# Patient Record
Sex: FEMALE | Race: WHITE
[De-identification: names, ages, dates, MRNs, and addresses within clinical notes are randomized per-mention and may not be internally consistent; named-entity substitution may affect disease eponyms.]

---

## 2017-05-23 ENCOUNTER — HOSPITAL ENCOUNTER (OUTPATIENT)
Dept: HOSPITAL 47 - ORWHC2ENDO | Age: 57
Discharge: HOME | End: 2017-05-23
Attending: SURGERY
Payer: COMMERCIAL

## 2017-05-23 VITALS — DIASTOLIC BLOOD PRESSURE: 58 MMHG | SYSTOLIC BLOOD PRESSURE: 119 MMHG | HEART RATE: 67 BPM

## 2017-05-23 VITALS — RESPIRATION RATE: 18 BRPM

## 2017-05-23 VITALS — TEMPERATURE: 98.4 F

## 2017-05-23 VITALS — BODY MASS INDEX: 24 KG/M2

## 2017-05-23 DIAGNOSIS — Z79.899: ICD-10-CM

## 2017-05-23 DIAGNOSIS — Z87.891: ICD-10-CM

## 2017-05-23 DIAGNOSIS — K63.5: ICD-10-CM

## 2017-05-23 DIAGNOSIS — Z12.11: Primary | ICD-10-CM

## 2017-05-23 DIAGNOSIS — K57.30: ICD-10-CM

## 2017-05-23 DIAGNOSIS — F41.9: ICD-10-CM

## 2017-05-23 DIAGNOSIS — F31.9: ICD-10-CM

## 2017-05-23 DIAGNOSIS — J44.9: ICD-10-CM

## 2017-05-23 PROCEDURE — 45380 COLONOSCOPY AND BIOPSY: CPT

## 2017-05-23 PROCEDURE — 88305 TISSUE EXAM BY PATHOLOGIST: CPT

## 2017-05-23 NOTE — P.OP
Date of Procedure: 05/23/17


Preoperative Diagnosis: 


Screening colonoscopy


Postoperative Diagnosis: 


Diverticulosis





Left colon polyp


Procedure(s) Performed: 


Colonoscopy


Implants: 





Anesthesia: MAC


Surgeon: Wlilie Hartmann


Pathology: other (Left colon polyp)


Condition: stable


Disposition: PACU


Indications for Procedure: 





Operative Findings: 





Description of Procedure: 


The patient's placed on the endoscopy table in the lateral position.  She 

received IV sedation.  Digital rectal exam was performed which revealed no 

abnormalities.  The flexible colonoscope was then placed patient anus and 

passed throughout the entire colon.  The ileocecal valve lesions.  The cecum, 

ascending and transverse colon appeared normal.  In the descending colon there 

was a small polyp seen this removed the forcep there is also some mild 

diverticular changes.  In the sigmoid colon there is more diverticular changes 

noted.  Scope was then brought back the rectum and this appeared normal.  The 

scope was withdrawn for patient.

## 2017-05-23 NOTE — P.GSHP
History of Present Illness


H&P Date: 05/23/17


Chief Complaint: Screening colonoscopy





56-year-old female who presents today for screening colonoscopy.  Patient 

denies a significant GI complaints.





- Constitutional


Constitutional: Reports as per HPI





Past Medical History


Past Medical History: COPD


History of Any Multi-Drug Resistant Organisms: None Reported


Past Surgical History: Tonsillectomy, Tubal Ligation


Past Anesthesia/Blood Transfusion Reactions: No Reported Reaction


Past Psychological History: Anxiety, Bipolar, Depression


Smoking Status: Former smoker


Past Alcohol Use History: Occasional


Additional Past Alcohol Use History / Comment(s): STARTED SMOKING AT AGE 15 

QUIT SMOKING 2015 SMOKED 1PPD/ DRINKS ONE GLASS OF WINE DAILY


Past Drug Use History: None Reported





- Past Family History


  ** Mother


Family Medical History: Cancer


Additional Family Medical History / Comment(s): CERVICAL CANCER





  ** Father


Family Medical History: Cancer





Medications and Allergies


 Home Medications











 Medication  Instructions  Recorded  Confirmed  Type


 


Ergocalciferol [Vitamin D2] 50,000 unit PO Q7D 05/01/17 05/23/17 History


 


LORazepam [Ativan] 1 mg PO BID 05/01/17 05/23/17 History


 


OLANZapine [ZyPREXA] 20 mg PO HS 05/01/17 05/23/17 History


 


Sertraline HCl [Zoloft] 200 mg PO DAILY 05/01/17 05/23/17 History


 


cloNIDine HCL [Catapres] 0.2 mg PO BID 05/01/17 05/23/17 History


 


lamoTRIgine [LaMICtal] 100 mg PO BID 05/01/17 05/23/17 History


 


Lurasidone [Latuda] 40 mg PO DAILY 05/19/17 05/23/17 History











 Allergies











Allergy/AdvReac Type Severity Reaction Status Date / Time


 


No Known Allergies Allergy   Verified 05/23/17 08:25














Surgical - Exam


 Vital Signs











Temp Pulse Resp BP Pulse Ox


 


 98.4 F   73   16   125/73   96 


 


 05/23/17 08:19  05/23/17 08:19  05/23/17 08:19  05/23/17 08:19  05/23/17 08:19














- General


well developed, no distress





- Eyes


PERRL





- ENT


normal pinna





- Neck


no masses





- Respiratory


normal expansion





- Cardiovascular


Rhythm: regular





- Abdomen


Abdomen: soft, non tender





Assessment and Plan


Plan: 





We'll perform screening colonoscopy

## 2018-12-18 ENCOUNTER — HOSPITAL ENCOUNTER (OUTPATIENT)
Dept: HOSPITAL 47 - WWCWWP | Age: 58
End: 2018-12-18
Payer: COMMERCIAL

## 2018-12-18 VITALS — DIASTOLIC BLOOD PRESSURE: 67 MMHG | SYSTOLIC BLOOD PRESSURE: 141 MMHG | HEART RATE: 88 BPM | TEMPERATURE: 97.6 F

## 2018-12-18 VITALS — BODY MASS INDEX: 23.8 KG/M2

## 2018-12-18 DIAGNOSIS — Z12.31: Primary | ICD-10-CM

## 2018-12-18 PROCEDURE — 77067 SCR MAMMO BI INCL CAD: CPT

## 2018-12-18 NOTE — P.HPOB
History of Present Illness


H&P Date: 18


Chief Complaint: The patient is here for her routine gynecologic exam and 

mammogram.


This is a 58-year-old  with an LMP of .  The patient states she 

continues to be on HRT which she has used for hot flashes and night sweats.  

She states her menopausal symptoms have improved while she has been on HRT.  

She is uncertain if she is taking Premarin or Prempro, but she has been taking 

half tablet of 0.625 mg daily.  Previous records from Dr. Houston' office 

indicates she was on Prempro 0.5/1.5 daily in the past.  She denies any 

postmenopausal bleeding.  She has been getting her HRT prescription from 

[facility].  She is without gynecologic complaints.








Review of Systems


The patient has gained 9 pounds over the last 2 years.  She denies respiratory, 

cardiac, or G.I. problems.








Past Medical History


Past Medical History: COPD, Fibromyalgia


Additional Past Medical History / Comment(s): pre diabetic.   PAST GYN HISTORY: 

she was treated for chlamydia many years ago.  She has no other history of STDs.


History of Any Multi-Drug Resistant Organisms: None Reported


Past Surgical History: Tonsillectomy, Tubal Ligation


Past Anesthesia/Blood Transfusion Reactions: No Reported Reaction


Past Psychological History: Anxiety, Bipolar, Depression


Smoking Status: Former smoker


Past Alcohol Use History: Occasional


Additional Past Alcohol Use History / Comment(s): STARTED SMOKING AT AGE 15 

QUIT SMOKING  SMOKED 1PPD/ DRINKS ONE GLASS OF WINE DAILY


Past Drug Use History: None Reported





- Past Family History


  ** Mother


Family Medical History: Cancer


Additional Family Medical History / Comment(s): CERVICAL CANCER





  ** Father


Family Medical History: Cancer





Medications and Allergies


 Home Medications











 Medication  Instructions  Recorded  Confirmed  Type


 


Ergocalciferol [Vitamin D2] 50,000 unit PO Q7D 17 History


 


LORazepam [Ativan] 1 mg PO BID 17 History


 


Sertraline HCl [Zoloft] 200 mg PO DAILY 17 History


 


cloNIDine HCL [Catapres] 0.2 mg PO BID 17 History


 


lamoTRIgine [LaMICtal] 100 mg PO BID 17 History


 


Dicyclomine [Bentyl] 10 mg PO PRN 18  History


 


Estrogens, Conjugated [Premarin] 0.625 mg PO AS DIRECTED 18 

History


 


Omeprazole 20 mg PO PRN 18  History


 


metFORMIN HCL [Glucophage Xr] 500 mg PO HS 18 History


 


risperiDONE [RisperDAL] 0.5 mg PO TID 18 History











 Allergies











Allergy/AdvReac Type Severity Reaction Status Date / Time


 


No Known Allergies Allergy   Verified 18 10:43














Exam


 Vital Signs











  Temp Pulse BP


 


 18 11:08  97.6 F  88  141/67








 Intake and Output











 18





 22:59 06:59 14:59


 


Other:   


 


  Weight   63.049 kg











Height 5'4", weight 139 pounds, BMI 23.9.





This is a well-developed well-nourished white female who is alert and oriented 

times 3 in no acute distress.


HEENT: Within normal limits.


NECK: Supple without mass or thyromegaly.


CHEST AND LUNGS: Clear to auscultation.


HEART: Regular rate and rhythm.


BREASTS: Are without mass or discharge.


AXILLARY EXAM: Negative for adenopathy.


BACK: Negative for CVA tenderness.


ABDOMEN: Soft, nontender, without palpable masses.


PELVIC EXAM: Normal external genitalia with mild atrophy. Cervix and vagina 

appear normal mild atrophy. There is no unusual discharge.  There is no 

evidence of prolapse.  The uterus is midposition, nongravid size and nontender. 

There are no palpable adnexal masses or tenderness.


RECTAL EXAM: rectovaginal exam is negative for mass or tenderness and is 

negative for occult blood.


EXTREMITIES: Nontender.





IMPRESSION: 


1.  58-year-old menopausal female who has done well with low dose HRT used for 

vasomotor menopausal symptoms.


2.  Normal gynecologic exam.





PLAN: 


1.  Pap smear was performed.


2.  Self breast awareness was discussed with the patient.


3.  Greening mammogram will be done today.


4.  We have had a long discussion regarding HRT including the [facility] study 

findings.  We have discussed the increased risk for heart attack, stroke and 

breast cancer on HRT.  I recommended that she try to wean off of this as soon 

as possible.  Within the next month or 2, I have recommended that she go to 

taking her HRT every other day, and then to wean off completely in the next 

month or 2 following.  She was instructed to call she has problems with 

vasomotor symptoms or if any postmenopausal bleeding.  The electronic 

prescription for Prempro 0.3/1.5 will be sent to [facility] pharmacy on [address].  

She was instructed to return if she has not been able to wean off completely 

within 6 months.  She will also cause she is having worsening vasomotor 

symptoms when she tries to wean off.


5.Osteoporosis prevention was discussed.  I have stressed the importance of 

adequate calcium, vitamin D and regular exercise.  Recommended amounts of 

calcium and vitamin D were also discussed.


6.  She will return in one year and PRN.

## 2018-12-19 NOTE — MM
Reason for exam: screening  (asymptomatic).

Last mammogram was performed 2 years and 6 months ago.



History:

Patient is postmenopausal and history of other cancer.

Benign cyst aspiration of the left breast, September 9, 1998.  Benign cyst 

aspiration of the right breast, September 9, 1998.  8 cyst aspirations of the left

breast.

Taking estrogen beginning at age 55.



Physical Findings:

A clinical breast exam by your physician is recommended on an annual basis and 

results should be correlated with mammographic findings.



MG Screening Mammo w CAD

Bilateral CC and MLO view(s) were taken.

Prior study comparison: June 30, 2016, left breast MG work up mamm w CAD LT.  June 28, 2016, bilateral MG screening mammo w CAD.

The breast tissue is extremely dense which could obscure a lesion on mammography. 

Benign appearing bilateral calcifications. No suspicious abnormality.  No 

significant changes when compared with prior studies.





ASSESSMENT: Benign, BI-RAD 2



RECOMMENDATION:

Routine screening mammogram of both breasts in 1 year.

## 2020-10-21 ENCOUNTER — HOSPITAL ENCOUNTER (INPATIENT)
Dept: HOSPITAL 47 - EC | Age: 60
LOS: 5 days | Discharge: HOME | DRG: 885 | End: 2020-10-26
Payer: COMMERCIAL

## 2020-10-21 VITALS — BODY MASS INDEX: 21.8 KG/M2

## 2020-10-21 DIAGNOSIS — R45.851: ICD-10-CM

## 2020-10-21 DIAGNOSIS — Z86.59: ICD-10-CM

## 2020-10-21 DIAGNOSIS — Z80.49: ICD-10-CM

## 2020-10-21 DIAGNOSIS — Z90.89: ICD-10-CM

## 2020-10-21 DIAGNOSIS — F41.9: ICD-10-CM

## 2020-10-21 DIAGNOSIS — M79.7: ICD-10-CM

## 2020-10-21 DIAGNOSIS — I10: ICD-10-CM

## 2020-10-21 DIAGNOSIS — J44.9: ICD-10-CM

## 2020-10-21 DIAGNOSIS — Z98.51: ICD-10-CM

## 2020-10-21 DIAGNOSIS — G47.00: ICD-10-CM

## 2020-10-21 DIAGNOSIS — Z98.890: ICD-10-CM

## 2020-10-21 DIAGNOSIS — Z86.19: ICD-10-CM

## 2020-10-21 DIAGNOSIS — G89.29: ICD-10-CM

## 2020-10-21 DIAGNOSIS — Z56.0: ICD-10-CM

## 2020-10-21 DIAGNOSIS — F12.10: ICD-10-CM

## 2020-10-21 DIAGNOSIS — E11.9: ICD-10-CM

## 2020-10-21 DIAGNOSIS — F33.2: Primary | ICD-10-CM

## 2020-10-21 DIAGNOSIS — F17.290: ICD-10-CM

## 2020-10-21 DIAGNOSIS — Z79.84: ICD-10-CM

## 2020-10-21 DIAGNOSIS — Z81.8: ICD-10-CM

## 2020-10-21 DIAGNOSIS — Z71.6: ICD-10-CM

## 2020-10-21 DIAGNOSIS — Z82.5: ICD-10-CM

## 2020-10-21 DIAGNOSIS — F17.210: ICD-10-CM

## 2020-10-21 DIAGNOSIS — G43.909: ICD-10-CM

## 2020-10-21 DIAGNOSIS — Z79.899: ICD-10-CM

## 2020-10-21 DIAGNOSIS — F13.10: ICD-10-CM

## 2020-10-21 DIAGNOSIS — J30.9: ICD-10-CM

## 2020-10-21 LAB
GLUCOSE BLD-MCNC: 100 MG/DL (ref 75–99)
GLUCOSE BLD-MCNC: 101 MG/DL (ref 75–99)

## 2020-10-21 PROCEDURE — 83036 HEMOGLOBIN GLYCOSYLATED A1C: CPT

## 2020-10-21 PROCEDURE — 82075 ASSAY OF BREATH ETHANOL: CPT

## 2020-10-21 PROCEDURE — 80306 DRUG TEST PRSMV INSTRMNT: CPT

## 2020-10-21 PROCEDURE — 80053 COMPREHEN METABOLIC PANEL: CPT

## 2020-10-21 PROCEDURE — 80061 LIPID PANEL: CPT

## 2020-10-21 PROCEDURE — 84443 ASSAY THYROID STIM HORMONE: CPT

## 2020-10-21 PROCEDURE — 99285 EMERGENCY DEPT VISIT HI MDM: CPT

## 2020-10-21 PROCEDURE — 85025 COMPLETE CBC W/AUTO DIFF WBC: CPT

## 2020-10-21 SDOH — ECONOMIC STABILITY - INCOME SECURITY: UNEMPLOYMENT, UNSPECIFIED: Z56.0

## 2020-10-21 NOTE — ED
General Adult HPI





- General


Chief complaint: Psychiatric Symptoms


Stated complaint: mental health


Time Seen by Provider: 10/21/20 14:52


Source: patient, family


Mode of arrival: ambulatory


Limitations: no limitations





- History of Present Illness


Initial comments: 


Dictation was produced using dragon dictation software. please excuse any 

grammatical, word or spelling errors. 





This patient was cared for during a federal and state declared state of 

emergency secondary to Covid 19





Chief Complaint: 60-year-old female past medical history of depression anxiety 

and anxiety presents with depression





History of Present Illness: Patient is a 60-year-old female she has past medical

history depression and anxiety.  She does have a counselor and the medical 

provider that treats her for this.  She is depressed because her  will 

drive her out of the house due to the risk of jad coronavirus.  She is 

very upset about this.  She is accompanied by her sister Alissa who drove her to 

the emergency room to be evaluated.  Patient denies any suicidal attempt.  She 

had does however feel suicidal.  She repeatedly says she does not want to be 

here.  She does not have a specific plan.  She denies any homicidal ideation.  

No visual or auditory hallucinations.  Patient does not have any medical 

complaints at this time.








The ROS documented in this emergency department record has been reviewed and 

confirmed by me.  Those systems with pertinent positive or negative responses 

have been documented in the HPI.  All other systems are other negative and/or 

noncontributory.








PHYSICAL EXAM:


General Impression: Alert and oriented x3, not in acute distress


HEENT: Normocephalic atraumatic, extra-ocular movements intact, pupils equal and

reactive to light bilaterally, mucous membranes moist.


Cardiovascular: Heart regular rate and rhythm


Chest: Able to complete full sentences, no retractions, no tachypnea


Abdomen: abdomen soft, non-tender, non-distended, no organomegaly


Musculoskeletal: Pulses present and equal in all extremities, no peripheral 

edema


Motor:  no focal deficits noted


Neurological: CN II-XII grossly intact, no focal motor or sensory deficits noted


Skin: Intact with no visualized rashes


Psych: Tearful





ED course: 60 y Old female presents with depression and suicidal ideation.  

Signs upon arrival are within acceptable limits.  Patient medically cleared for 

EPS evaluation.





Patient evaluated by EPS and will admit patient to inpatient psychiatry.




















- Related Data


                                Home Medications











 Medication  Instructions  Recorded  Confirmed


 


Ergocalciferol [Vitamin D2] 50,000 unit PO MO 05/01/17 10/21/20


 


cloNIDine HCL [Catapres] 0.2 mg PO QID 05/01/17 10/21/20


 


lamoTRIgine [LaMICtal] 100 mg PO TID 05/01/17 10/21/20


 


Atomoxetine HCl [Strattera] 60 mg PO DAILY 10/21/20 10/21/20


 


Ibuprofen [Motrin] 800 mg PO DAILY PRN 10/21/20 10/21/20


 


LORazepam [Ativan] 2 mg PO Q12H PRN 10/21/20 10/21/20


 


Vilazodone HCl [Viibryd] 40 mg PO DAILY 10/21/20 10/21/20


 


metFORMIN  mg PO DAILY 10/21/20 10/21/20


 


risperiDONE 4 mg PO DAILY 10/21/20 10/21/20











                                    Allergies











Allergy/AdvReac Type Severity Reaction Status Date / Time


 


No Known Allergies Allergy   Verified 10/21/20 16:18














Review of Systems


ROS Statement: 


Those systems with pertinent positive or pertinent negative responses have been 

documented in the HPI.





ROS Other: All systems not noted in ROS Statement are negative.





Past Medical History


Past Medical History: COPD, Fibromyalgia


Additional Past Medical History / Comment(s): pre diabetic.   PAST GYN HISTORY: 

she was treated for chlamydia many years ago.  She has no other history of STDs.


History of Any Multi-Drug Resistant Organisms: None Reported


Past Surgical History: Tonsillectomy, Tubal Ligation


Past Anesthesia/Blood Transfusion Reactions: No Reported Reaction


Past Psychological History: Anxiety, Bipolar, Depression


Smoking Status: Vaper


Past Alcohol Use History: Occasional


Past Drug Use History: None Reported





- Past Family History


  ** Mother


Family Medical History: Cancer


Additional Family Medical History / Comment(s): CERVICAL CANCER





  ** Father


Family Medical History: Cancer





General Exam


Limitations: no limitations





Course


                                   Vital Signs











  10/21/20





  14:46


 


Temperature 98.1 F


 


Pulse Rate 98


 


Respiratory 16





Rate 


 


Blood Pressure 170/91


 


O2 Sat by Pulse 95





Oximetry 














Medical Decision Making





- Lab Data


                                   Lab Results











  10/21/20 Range/Units





  15:27 


 


Urine Opiates Screen  Not Detected  (NotDetected)  


 


Ur Oxycodone Screen  Not Detected  (NotDetected)  


 


Urine Methadone Screen  Not Detected  (NotDetected)  


 


Ur Propoxyphene Screen  Not Detected  (NotDetected)  


 


Ur Barbiturates Screen  Not Detected  (NotDetected)  


 


U Tricyclic Antidepress  Not Detected  (NotDetected)  


 


Ur Phencyclidine Scrn  Not Detected  (NotDetected)  


 


Ur Amphetamines Screen  Not Detected  (NotDetected)  


 


U Methamphetamines Scrn  Not Detected  (NotDetected)  


 


U Benzodiazepines Scrn  Detected H  (NotDetected)  


 


Urine Cocaine Screen  Not Detected  (NotDetected)  


 


U Marijuana (THC) Screen  Detected H  (NotDetected)  














Disposition


Clinical Impression: 


 Suicidal behavior





Disposition: ADMITTED AS IP TO THIS [facility]


Condition: Fair


Decision Time: 17:01

## 2020-10-22 LAB
ALBUMIN SERPL-MCNC: 4.6 G/DL (ref 3.5–5)
ALP SERPL-CCNC: 96 U/L (ref 38–126)
ALT SERPL-CCNC: 27 U/L (ref 4–34)
ANION GAP SERPL CALC-SCNC: 12 MMOL/L
AST SERPL-CCNC: 31 U/L (ref 14–36)
BASOPHILS # BLD AUTO: 0 K/UL (ref 0–0.2)
BASOPHILS NFR BLD AUTO: 0 %
BUN SERPL-SCNC: 7 MG/DL (ref 7–17)
CALCIUM SPEC-MCNC: 9.9 MG/DL (ref 8.4–10.2)
CHLORIDE SERPL-SCNC: 101 MMOL/L (ref 98–107)
CHOLEST SERPL-MCNC: 256 MG/DL (ref ?–200)
CO2 SERPL-SCNC: 24 MMOL/L (ref 22–30)
EOSINOPHIL # BLD AUTO: 0.1 K/UL (ref 0–0.7)
EOSINOPHIL NFR BLD AUTO: 1 %
ERYTHROCYTE [DISTWIDTH] IN BLOOD BY AUTOMATED COUNT: 4.83 M/UL (ref 3.8–5.4)
ERYTHROCYTE [DISTWIDTH] IN BLOOD: 11.9 % (ref 11.5–15.5)
GLUCOSE BLD-MCNC: 105 MG/DL (ref 75–99)
GLUCOSE BLD-MCNC: 106 MG/DL (ref 75–99)
GLUCOSE BLD-MCNC: 124 MG/DL (ref 75–99)
GLUCOSE BLD-MCNC: 129 MG/DL (ref 75–99)
GLUCOSE SERPL-MCNC: 105 MG/DL (ref 74–99)
HBA1C MFR BLD: 5.9 % (ref 4–6)
HCT VFR BLD AUTO: 45.2 % (ref 34–46)
HDLC SERPL-MCNC: 61 MG/DL (ref 40–60)
HGB BLD-MCNC: 14.9 GM/DL (ref 11.4–16)
LDLC SERPL CALC-MCNC: 145 MG/DL (ref 0–99)
LYMPHOCYTES # SPEC AUTO: 1.8 K/UL (ref 1–4.8)
LYMPHOCYTES NFR SPEC AUTO: 17 %
MCH RBC QN AUTO: 30.9 PG (ref 25–35)
MCHC RBC AUTO-ENTMCNC: 33.1 G/DL (ref 31–37)
MCV RBC AUTO: 93.5 FL (ref 80–100)
MONOCYTES # BLD AUTO: 0.4 K/UL (ref 0–1)
MONOCYTES NFR BLD AUTO: 4 %
NEUTROPHILS # BLD AUTO: 7.9 K/UL (ref 1.3–7.7)
NEUTROPHILS NFR BLD AUTO: 76 %
PLATELET # BLD AUTO: 387 K/UL (ref 150–450)
POTASSIUM SERPL-SCNC: 4.3 MMOL/L (ref 3.5–5.1)
PROT SERPL-MCNC: 7.4 G/DL (ref 6.3–8.2)
SODIUM SERPL-SCNC: 137 MMOL/L (ref 137–145)
TRIGL SERPL-MCNC: 252 MG/DL (ref ?–150)
WBC # BLD AUTO: 10.5 K/UL (ref 3.8–10.6)

## 2020-10-22 RX ADMIN — ACETAMINOPHEN PRN ML: 160 SOLUTION ORAL at 08:16

## 2020-10-22 RX ADMIN — ACETAMINOPHEN PRN MG: 325 TABLET, FILM COATED ORAL at 17:23

## 2020-10-22 RX ADMIN — ACETAMINOPHEN PRN MG: 325 TABLET, FILM COATED ORAL at 13:03

## 2020-10-22 RX ADMIN — ACETAMINOPHEN PRN MG: 325 TABLET, FILM COATED ORAL at 04:31

## 2020-10-22 RX ADMIN — IBUPROFEN PRN MG: 800 TABLET, FILM COATED ORAL at 08:32

## 2020-10-22 RX ADMIN — FLUTICASONE PROPIONATE SCH SPRAY: 50 SPRAY, METERED NASAL at 20:41

## 2020-10-22 NOTE — P.CONS
History of Present Illness





- Reason for Consult


Consult date: 10/22/20


medical eval





- Chief Complaint


suicidal





- History of Present Illness





Jaylene Reilly is a 61 yo F with PMH of major depression, T2DM who was 

brought to the ED by her sister due to expressing suicidal ideation. She lives 

with her ex- and notes she is very lonely due to the pandemic and has no 

one to talk to. She also complains of sinus pressure and headache over the psat 

few weeks. She denies chest pain, fever, chills.





Review of Systems


All systems: negative


Constitutional: Denies chills, Denies fever


Eyes: denies blurred vision, denies pain


Ears, nose, mouth and throat: Reports headache, Denies sore throat


Cardiovascular: Denies chest pain, Denies shortness of breath


Respiratory: Denies cough


Gastrointestinal: Denies abdominal pain, Denies diarrhea, Denies nausea, Denies 

vomiting


Genitourinary: Denies dysuria, Denies hematuria


Musculoskeletal: Denies myalgias


Integumentary: Denies pruritus, Denies rash


Neurological: Denies numbness, Denies weakness


Psychiatric: Reports anxiety, Reports depression, Reports suicidal ideation


Endocrine: Denies fatigue, Denies weight change





Past Medical History


Past Medical History: COPD


Additional Past Medical History / Comment(s): pre diabetic.   PAST GYN HISTORY: 

she was treated for chlamydia many years ago.  She has no other history of STDs.


History of Any Multi-Drug Resistant Organisms: None Reported


Past Surgical History: Tonsillectomy, Tubal Ligation


Past Anesthesia/Blood Transfusion Reactions: No Reported Reaction


Past Psychological History: Anxiety, Bipolar, Depression


Smoking Status: Vaper


Past Alcohol Use History: Occasional


Additional Past Alcohol Use History / Comment(s): STARTED SMOKING AT AGE 15 QUIT

SMOKING 2015 SMOKED 1PPD/ DRINKS ONE GLASS OF WINE DAILY


Past Drug Use History: None Reported





- Past Family History


  ** Mother


Family Medical History: COPD


Additional Family Medical History / Comment(s): CERVICAL CANCER





  ** Father


Family Medical History: Cancer





Medications and Allergies


                                Home Medications











 Medication  Instructions  Recorded  Confirmed  Type


 


Ergocalciferol [Vitamin D2] 50,000 unit PO MO 05/01/17 10/21/20 History


 


cloNIDine HCL [Catapres] 0.2 mg PO QID 05/01/17 10/21/20 History


 


lamoTRIgine [LaMICtal] 100 mg PO TID 05/01/17 10/21/20 History


 


Atomoxetine HCl [Strattera] 60 mg PO DAILY 10/21/20 10/21/20 History


 


Ibuprofen [Motrin] 800 mg PO DAILY PRN 10/21/20 10/21/20 History


 


LORazepam [Ativan] 2 mg PO Q12H PRN 10/21/20 10/21/20 History


 


Vilazodone HCl [Viibryd] 40 mg PO DAILY 10/21/20 10/21/20 History


 


metFORMIN  mg PO DAILY 10/21/20 10/21/20 History


 


risperiDONE 4 mg PO DAILY 10/21/20 10/21/20 History








                                    Allergies











Allergy/AdvReac Type Severity Reaction Status Date / Time


 


No Known Allergies Allergy   Verified 10/21/20 18:22














Physical Exam


Vitals: 


                                   Vital Signs











  Temp Pulse Resp BP BP


 


 10/22/20 22:02   105 H    138/97


 


 10/22/20 21:31   101 H    170/77


 


 10/22/20 17:21   113 H    135/101


 


 10/22/20 14:13   111 H    146/73


 


 10/22/20 13:05   125 H    177/77


 


 10/22/20 08:17   99    166/91


 


 10/22/20 01:36  97.8 F  87  18  147/85 








                                Intake and Output











 10/22/20 10/22/20 10/22/20





 06:59 14:59 22:59


 


Other:   


 


  Weight  57.606 kg 














General: well nourished, well developed, NAD. Vitals reviewed


Eyes: PERRL, EOMI, conjunctiva normal


HENT: normocephalic, mucus membranes moist


Neck: supple, no JVD


Lungs: normal respiratory effort, no wheezes or rales


CV: Regular rate and rhythm, no murmur. Peripheral pulses 2+


Abdomen: soft, nondistended, no organomegaly


Lymph: no cervical or axillary LAD


Skin: warm and dry.


Neuro: A&Ox3, normal mood and affect





Results


CBC & Chem 7: 


                                 10/22/20 11:28





                                 10/22/20 11:28


Labs: 


                  Abnormal Lab Results - Last 24 Hours (Table)











  10/22/20 10/22/20 10/22/20 Range/Units





  08:06 11:28 11:28 


 


Neutrophils #   7.9 H   (1.3-7.7)  k/uL


 


Glucose    105 H  (74-99)  mg/dL


 


POC Glucose (mg/dL)  124 H    (75-99)  mg/dL


 


Triglycerides    252 H  (<150)  mg/dL


 


Cholesterol    256 H  (<200)  mg/dL


 


LDL Cholesterol, Calc    145 H  (0-99)  mg/dL


 


HDL Cholesterol    61 H  (40-60)  mg/dL














  10/22/20 10/22/20 10/22/20 Range/Units





  12:57 17:45 20:06 


 


Neutrophils #     (1.3-7.7)  k/uL


 


Glucose     (74-99)  mg/dL


 


POC Glucose (mg/dL)  106 H  105 H  129 H  (75-99)  mg/dL


 


Triglycerides     (<150)  mg/dL


 


Cholesterol     (<200)  mg/dL


 


LDL Cholesterol, Calc     (0-99)  mg/dL


 


HDL Cholesterol     (40-60)  mg/dL














Assessment and Plan


(1) Allergic rhinitis


Current Visit: Yes   Status: Acute   Code(s): J30.9 - ALLERGIC RHINITIS, 

UNSPECIFIED   SNOMED Code(s): 98193396


   





(2) Suicidal behavior


Current Visit: Yes   Status: Acute   Code(s): R45.89 - OTHER SYMPTOMS AND SIGNS 

INVOLVING EMOTIONAL STATE   SNOMED Code(s): 147456230


   


Plan: 





1. Suicidal ideation. Management per psychiatry


2. Allergic rhinitis. Start flonase


3. T2DM. Continue metformin
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
## 2020-10-23 LAB
GLUCOSE BLD-MCNC: 104 MG/DL (ref 75–99)
GLUCOSE BLD-MCNC: 106 MG/DL (ref 75–99)
GLUCOSE BLD-MCNC: 124 MG/DL (ref 75–99)
GLUCOSE BLD-MCNC: 157 MG/DL (ref 75–99)

## 2020-10-23 RX ADMIN — IBUPROFEN PRN MG: 800 TABLET, FILM COATED ORAL at 07:57

## 2020-10-23 RX ADMIN — ACETAMINOPHEN PRN MG: 325 TABLET, FILM COATED ORAL at 04:17

## 2020-10-23 RX ADMIN — IBUPROFEN PRN MG: 800 TABLET, FILM COATED ORAL at 20:53

## 2020-10-23 RX ADMIN — FLUTICASONE PROPIONATE SCH SPRAY: 50 SPRAY, METERED NASAL at 20:52

## 2020-10-23 RX ADMIN — ATORVASTATIN CALCIUM SCH MG: 10 TABLET, FILM COATED ORAL at 20:17

## 2020-10-23 RX ADMIN — FLUTICASONE PROPIONATE SCH SPRAY: 50 SPRAY, METERED NASAL at 07:56

## 2020-10-23 RX ADMIN — IBUPROFEN PRN MG: 800 TABLET, FILM COATED ORAL at 00:42

## 2020-10-24 LAB
GLUCOSE BLD-MCNC: 104 MG/DL (ref 75–99)
GLUCOSE BLD-MCNC: 112 MG/DL (ref 75–99)
GLUCOSE BLD-MCNC: 145 MG/DL (ref 75–99)
GLUCOSE BLD-MCNC: 96 MG/DL (ref 75–99)

## 2020-10-24 RX ADMIN — FLUTICASONE PROPIONATE SCH SPRAY: 50 SPRAY, METERED NASAL at 08:22

## 2020-10-24 RX ADMIN — FLUTICASONE PROPIONATE SCH SPRAY: 50 SPRAY, METERED NASAL at 21:14

## 2020-10-24 RX ADMIN — ATORVASTATIN CALCIUM SCH MG: 10 TABLET, FILM COATED ORAL at 21:15

## 2020-10-24 RX ADMIN — IBUPROFEN PRN MG: 800 TABLET, FILM COATED ORAL at 08:22

## 2020-10-24 RX ADMIN — ESCITALOPRAM OXALATE SCH MG: 20 TABLET, FILM COATED ORAL at 08:21

## 2020-10-24 RX ADMIN — ACETAMINOPHEN PRN MG: 325 TABLET, FILM COATED ORAL at 16:24

## 2020-10-24 NOTE — P.PN
Progress Note - Text


Progress Note Date: 10/24/20





Interval history: 


Patient was seen wandering the hallways and was directable and agreeable to s

peak with writer.  Patient appears to be somewhat anxious however claims that 

her anxiety and depression have been improving.  She claims that she was able to

sleep a bit better last night however still feels a little bit "shaky" from her 

withdrawals.  She states that she has been trying to go to some groups.  She was

asking about possible discharge on Monday.  She appeared to be calm and appropri

ate with writer during conversation.  She claims to fair appetite. At this time 

patient denies any suicidal or homicidal ideations intent or plan. Denies any 

Auditory or visual hallucinations. Patient denies any side effects from the 

medications and has been compliant with meds. 





Mental status exam: 


General Appearance: Patient appears to be stated age is alert, directable, and 

cooperative. 


Behavior: No agitated behavior. Patient is calm and directable.  Somewhat 

anxious.


Speech: Patient's speech is fluent and nonpressured. 


Mood/Affect: Mood is improving mildly, affect is congruent and constricted. 


Suicidality/Homicidality:  Patient denies having any suicidal or homicidal 

ideation intent or plan.  


Perceptions: Patient denies any auditory or visual hallucinations.  


Though content/process: There is no evidence of any delusional thought content 

and thought process is linear and goal-directed. 


Memory and concentration: AOX3, grossly intact for the purposes of this session


Judgment and insight: improving mildly





Assessment/Plan: Continue with current diagnosis. Patient continues to meet 

criteria for inpatient psychiatric admission for symptom stabilization and 

safety.Patient will be maintained on current psychotropic medication regimen.  

Monitor for medication compliance and for any psychotropic medication side 

effects. Will continue to monitor ongoing response to treatment.  Encouraged 

participation in milieu.

## 2020-10-25 LAB
GLUCOSE BLD-MCNC: 101 MG/DL (ref 75–99)
GLUCOSE BLD-MCNC: 103 MG/DL (ref 75–99)
GLUCOSE BLD-MCNC: 112 MG/DL (ref 75–99)
GLUCOSE BLD-MCNC: 131 MG/DL (ref 75–99)

## 2020-10-25 RX ADMIN — ACETAMINOPHEN PRN MG: 325 TABLET, FILM COATED ORAL at 17:39

## 2020-10-25 RX ADMIN — ACETAMINOPHEN PRN ML: 160 SOLUTION ORAL at 20:48

## 2020-10-25 RX ADMIN — ATORVASTATIN CALCIUM SCH MG: 10 TABLET, FILM COATED ORAL at 20:08

## 2020-10-25 RX ADMIN — FLUTICASONE PROPIONATE SCH SPRAY: 50 SPRAY, METERED NASAL at 20:09

## 2020-10-25 RX ADMIN — ESCITALOPRAM OXALATE SCH MG: 20 TABLET, FILM COATED ORAL at 08:49

## 2020-10-25 RX ADMIN — FLUTICASONE PROPIONATE SCH SPRAY: 50 SPRAY, METERED NASAL at 08:49

## 2020-10-25 NOTE — P.PN
Progress Note - Text


Progress Note Date: 10/25/20





Interval history: 


Patient was seen wandering the hallways and was directable and agreeable to s

peak with writer.  Patient appears to be somewhat anxious however claims that 

her anxiety and depression have been improving.  She claims that she just 

finished group and was speaking about her story and made her feel "like crying a

little bit" and states that she wanted to go to her room to lay down.  She 

claims that she was able to sleep a bit better last night however claims that 

she only received 5 hours of sleep and requested to have her trazodone 

increased.  Patient also claims that she takes melatonin at home who is willing 

to take that in the hospital.  She states that she has been trying to go to some

groups.  She appeared to be calm and appropriate with writer during 

conversation.  She claims to fair appetite. At this time patient denies any 

suicidal or homicidal ideations intent or plan. Denies any Auditory or visual 

hallucinations. Patient denies any side effects from the medications and has 

been compliant with meds. 





Mental status exam: 


General Appearance: Patient appears to be stated age is alert, directable, and 

cooperative. 


Behavior: No agitated behavior. Patient is calm and directable. 


Speech: Patient's speech is fluent and nonpressured. 


Mood/Affect: Mood is improving mildly, affect is congruent and constricted. 


Suicidality/Homicidality:  Patient denies having any suicidal or homicidal 

ideation intent or plan.  


Perceptions: Patient denies any auditory or visual hallucinations.  


Though content/process: There is no evidence of any delusional thought content 

and thought process is linear and goal-directed. 


Memory and concentration: AOX3, grossly intact for the purposes of this session


Judgment and insight: improving mildly





Assessment/Plan: Continue with current diagnosis. Patient continues to meet 

criteria for inpatient psychiatric admission for symptom stabilization and 

safety.Patient will be maintained on current psychotropic medication regimen, 

with the exception of increasing trazodone to 200 mg daily at bedtime for 

insomnia/mood and also starting melatonin 3 mg daily at bedtime for insomnia.  

Monitor for medication compliance and for any psychotropic medication side 

effects. Will continue to monitor ongoing response to treatment.  Encouraged 

participation in milieu.

## 2020-10-26 VITALS — DIASTOLIC BLOOD PRESSURE: 63 MMHG | HEART RATE: 85 BPM | SYSTOLIC BLOOD PRESSURE: 108 MMHG

## 2020-10-26 VITALS — TEMPERATURE: 98.6 F | RESPIRATION RATE: 16 BRPM

## 2020-10-26 LAB
GLUCOSE BLD-MCNC: 107 MG/DL (ref 75–99)
GLUCOSE BLD-MCNC: 99 MG/DL (ref 75–99)

## 2020-10-26 RX ADMIN — FLUTICASONE PROPIONATE SCH SPRAY: 50 SPRAY, METERED NASAL at 09:10

## 2020-10-26 RX ADMIN — ESCITALOPRAM OXALATE SCH MG: 20 TABLET, FILM COATED ORAL at 09:13
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
## 2023-10-20 ENCOUNTER — HOSPITAL ENCOUNTER (OUTPATIENT)
Dept: HOSPITAL 47 - RADMAMWWP | Age: 63
Discharge: HOME | End: 2023-10-20
Attending: FAMILY MEDICINE
Payer: COMMERCIAL

## 2023-10-20 DIAGNOSIS — Z78.0: ICD-10-CM

## 2023-10-20 DIAGNOSIS — R92.333: Primary | ICD-10-CM

## 2023-10-20 PROCEDURE — 76642 ULTRASOUND BREAST LIMITED: CPT

## 2023-10-20 PROCEDURE — 77066 DX MAMMO INCL CAD BI: CPT

## 2023-10-20 PROCEDURE — 77062 BREAST TOMOSYNTHESIS BI: CPT

## 2025-07-07 ENCOUNTER — HOSPITAL ENCOUNTER (OUTPATIENT)
Dept: HOSPITAL 47 - RADMAMWWP | Age: 65
Discharge: HOME | End: 2025-07-07
Attending: FAMILY MEDICINE
Payer: COMMERCIAL

## 2025-07-07 DIAGNOSIS — Z12.31: Primary | ICD-10-CM

## 2025-07-07 DIAGNOSIS — R92.333: ICD-10-CM

## 2025-07-07 DIAGNOSIS — Z78.0: ICD-10-CM

## 2025-07-07 PROCEDURE — 77067 SCR MAMMO BI INCL CAD: CPT
